# Patient Record
Sex: FEMALE | Race: WHITE | NOT HISPANIC OR LATINO | URBAN - METROPOLITAN AREA
[De-identification: names, ages, dates, MRNs, and addresses within clinical notes are randomized per-mention and may not be internally consistent; named-entity substitution may affect disease eponyms.]

---

## 2023-10-27 ENCOUNTER — EMERGENCY (EMERGENCY)
Facility: HOSPITAL | Age: 58
LOS: 1 days | Discharge: HOME | End: 2023-10-27
Attending: EMERGENCY MEDICINE | Admitting: EMERGENCY MEDICINE
Payer: SELF-PAY

## 2023-10-27 VITALS
SYSTOLIC BLOOD PRESSURE: 128 MMHG | HEART RATE: 84 BPM | DIASTOLIC BLOOD PRESSURE: 79 MMHG | RESPIRATION RATE: 19 BRPM | HEIGHT: 70 IN | WEIGHT: 160.06 LBS | TEMPERATURE: 98 F | OXYGEN SATURATION: 99 %

## 2023-10-27 PROCEDURE — 69200 CLEAR OUTER EAR CANAL: CPT | Mod: RT

## 2023-10-27 PROCEDURE — 99283 EMERGENCY DEPT VISIT LOW MDM: CPT | Mod: 25

## 2023-10-27 NOTE — ED PROCEDURE NOTE - CPROC ED FOREIGN BODY DETAIL1
Cotton removed with Anita clamps/The area was draped and prepped and the anatomic location of the suspected foreign body was explored in a bloodless field.

## 2023-10-27 NOTE — ED PROVIDER NOTE - CLINICAL SUMMARY MEDICAL DECISION MAKING FREE TEXT BOX
57 y/o Female with no significant PMHx here with cotton end of q-tip stuck in ear canal. On exam patient has visible cotton in canal. Foreign body removed without complication with vipul clamps. Exam after foreign body removal shows no erythema or irritation of canal and intact TM. Patient feels better and is stable for discharge.

## 2023-10-27 NOTE — ED PROVIDER NOTE - PHYSICAL EXAMINATION
Constitutional: awake and alert, in no acute distress  HEENT: head normocephalic and atraumatic. moist mucous membranes. Right ear with visible cotton in ear canal  Eyes: extraocular movements intact, normal conjunctiva  Neck: supple, normal ROM  Pulmonary: no respiratory distress  Skin: warm, dry, normal for ethnicity  Musculoskeletal: no edema, no deformity  Neurological: oriented x4, no focal neurologic deficit.   Psychiatric: calm and cooperative

## 2023-10-27 NOTE — ED PROVIDER NOTE - OBJECTIVE STATEMENT
57 y/o Female with no significant PMHx presenting with foreign body in ear today. Patient states that she has an end of a q-tip in her right ear. Denies fever/chills.

## 2023-10-27 NOTE — ED PROVIDER NOTE - PATIENT PORTAL LINK FT
You can access the FollowMyHealth Patient Portal offered by [facility] by registering at the following website: [email or web address]. By joining [facility]’s FollowMyHealth portal, you will also be able to view your health information using other applications (apps) compatible with our system.

## 2023-10-27 NOTE — ED PROVIDER NOTE - NSFOLLOWUPINSTRUCTIONS_ED_ALL_ED_FT
Ear Foreign Body  An ear foreign body is an object that is stuck in the ear. The object is usually stuck in the ear canal.    Do not try to remove an ear foreign body by yourself. This could push the object farther into the ear. It is important to see a health care provider to have the object removed as soon as possible.    What are the causes?  It is common for young children to put objects into their ear canal. These may include food items, amara, beads, parts of toys, and any other small objects that fit into the ear.    In adults, objects such as cotton swabs or hearing aid parts may get stuck in the ear canal.    What are the signs or symptoms?  An object in the ear may cause:  Pain.  Buzzing or roaring sounds.  Hearing loss.  Fluid or blood to come out of the ear.  Nausea and vomiting.  A feeling that your ear is stuffed up.  How is this diagnosed?  This condition may be diagnosed based on:  Information you provide about what the object is and how it got stuck.  Your symptoms.  A physical exam.  Tests of your hearing or the pressure inside your ear.  How is this treated?  Ear drops being put into an ear.  Treatment depends on what the object is, where it is in your ear, and whether any part of your ear is injured. If your health care provider can see the object in your ear, he or she may remove it by:  Using a tool, such as medical tweezers (forceps) or a suction tube (catheter).  Flushing your ear with water (irrigation). This is done only if the object is not likely to get bigger when it is put in water.  If your health care provider cannot see the object, or cannot remove it using one of these methods, you may need to see a specialist for removal.    Treatment may also include:  Antibiotic medicine taken as pills or given as ear drops. These may be prescribed to prevent infection.  Cleaning and treating any injuries in your ear.  Follow these instructions at home:  Take over-the-counter and prescription medicines only as told by your health care provider.  If you were prescribed an antibiotic medicine, such as pills or ear drops, take or use the antibiotic as told by your health care provider. Do not stop taking or using the antibiotic even if you start to feel better.  To prevent getting objects stuck in your ear:  Do not put anything into your ear, including cotton swabs. Talk with your health care provider about how to clean your ears safely.  Keep small objects out of the reach of young children. Teach children not to put anything into their ears.  Keep all follow-up visits. This is important.  Contact a health care provider if:  You have a headache.  You have a fever.  You have pain or swelling that gets worse.  You have reduced hearing in your ear.  You have ringing in your ear.  Get help right away if:  You notice blood or fluid coming from your ear.  You have sudden hearing loss.  Summary  An ear foreign body is an object that is stuck in the ear.  Do not try to remove an ear foreign body by yourself. This can push the object farther into the ear.  See a health care provider to have the object removed from your ear as soon as possible. This may keep you from developing an infection or hearing loss.  This information is not intended to replace advice given to you by your health care provider. Make sure you discuss any questions you have with your health care provider.
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
## 2023-10-29 DIAGNOSIS — T16.1XXA FOREIGN BODY IN RIGHT EAR, INITIAL ENCOUNTER: ICD-10-CM
